# Patient Record
Sex: MALE | Race: BLACK OR AFRICAN AMERICAN
[De-identification: names, ages, dates, MRNs, and addresses within clinical notes are randomized per-mention and may not be internally consistent; named-entity substitution may affect disease eponyms.]

---

## 2017-07-10 NOTE — ER DOCUMENT REPORT
HPI





- HPI


Pain Level: 3


Notes: 


Patient is a 22-year-old male with the ED complaining of intermittent left eye 

redness for 3 weeks.  Patient states that his eye became red again this 

morning.  He does have some irritation to it, but declines any discharge or 

sharp pain.  Patient denies a foreign body in the eye.  No recent illness.  He 

denies any changes in his vision.  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 

worker and does not know if just got in there.  Patient does wear contacts 

which has been leaving out with his eye is red.  Patient states that he does 

have little to no photophobia.  He is still able to move the eye around without 

any difficulties or pain.  Denies any headache, fever, URI, ear pain, dizziness

, nasal congestion/discharge, sore throat, chest pain, palpitations, syncope, 

cough, shortness breath, dyspnea, wheezing, abdominal pain, nausea/vomiting/

diarrhea, dysuria, urethral discharge, joint pains, or rash.  Denies any daily 

meds.  Denies any drug allergies.  Denies any significant past medical history.





- ROS


Notes: 


REVIEW OF SYSTEMS:


CONSTITUTIONAL :  Denies fever,  chills, or sweats.  Denies recent illness.


EENT:   see hpi


CARDIOVASCULAR:  Denies chest pain.  Denies palpitations or racing or irregular 

heart beat.  Denies ankle edema.


RESPIRATORY:  Denies cough, cold, or chest congestion.  Denies shortness of 

breath, difficulty breathing, or wheezing.


GASTROINTESTINAL:  Denies abdominal pain or distention.  Denies nausea, vomiting

, or diarrhea.  Denies blood in vomitus, stools, or per rectum.  Denies black, 

tarry stools.  Denies constipation.  


GENITOURINARY:  Denies difficulty urinating, painful urination, burning, 

frequency, blood in urine, or discharge.


MUSCULOSKELETAL:  Denies back or neck pain or stiffness.  Denies joint pain or 

swelling.


SKIN:   Denies rash, lesions or sores.


NEUROLOGICAL:  Denies confusion or altered mental status.  Denies passing out 

or loss of consciousness.  Denies dizziness or lightheadedness.  Denies 

headache.  Denies weakness or paralysis or loss of use of either side.  Denies 

problems with gait or speech.  Denies sensory loss, numbness, or tingling.  

Denies seizures.


PSYCHIATRIC:  Denies anxiety or stress.  Denies depression, suicidal ideation, 

or homicidal ideation.





ALL OTHER SYSTEMS REVIEWED AND NEGATIVE.





Dictation was performed using Dragon voice recognition software





- DERM


Skin Color: Normal





Past Medical History





- Social History


Smoking Status: Unknown if Ever Smoked


Family History: Reviewed & Not Pertinent


Patient has suicidal ideation: No


Patient has homicidal ideation: No


Renal/ Medical History: Denies: Hx Peritoneal Dialysis





Vertical Provider Document





- CONSTITUTIONAL


Agree With Documented VS: Yes


Notes: 


PHYSICAL EXAMINATION:





GENERAL: Well-appearing, well-nourished and in no acute distress.





HEAD: Atraumatic, normocephalic.





EYES: Pupils equal round and reactive to light, extraocular movements intact, 

sclera anicteric, Left conjunctiva injected.  No orbital cellulitis or 

discharge. No obvious abrasion/laceration/ulceration on flourescein/wood's lamp 

exam.  Non-tender with movement.  Eye irrigated and lid everted/wiped.  No 

pupillary changes in shape.





ENT: EAC clear b/l.  TM's intact b/l without erythema, fluid, or perforation.  

Nares patent and without discharge.  oropharynx clear without exudates.  No 

tonsilar hypertrophy or erythema.  Moist mucous membranes.  No sinus tenderness.





NECK: Normal range of motion, supple without lymphadenopathy





LUNGS: Breath sounds clear to auscultation bilaterally and equal.  No wheezes 

rales or rhonchi.





HEART: Regular rate and rhythm without murmurs, rubs, gallops





PSYCH: Normal mood, normal affect.





SKIN: Warm, Dry, normal turgor, no rashes or lesions noted.





- INFECTION CONTROL


TRAVEL OUTSIDE OF THE [place] IN LAST 30 DAYS: No





- RESPIRATORY


O2 Sat by Pulse Oximetry: 96





Course





- Re-evaluation


Re-evalutation: 





07/10/17 17:24


Patient is an afebrile, well-hydrated, 22-year-old male with suspected iritis 

to the left eye.  Vitals are stable.  PE otherwise unremarkable for any 

retained corneal related foreign body, deep space infection including orbital 

cellulitis or abscess, acute glaucoma, penetrating globe injury, retinal 

detachment, meningitis, viral/bacterial keratitis.  Patient symptomatology 

matched closely with iritis along with physical exam.  Iritis warrants an 

urgent consult ophthalmology in the next 1-2 days.  I will send him home with 

Polytrim eyedrops along with ketorolac eyedrops to use as directed.  Avoid use 

of contacts.  Wash hands regularly and do not scratch the eye.  Conservative 

measures otherwise for symptoms.  Return to the ED with any worsening/

concerning symptoms as reviewed.  Patient is in agreement.








- Vital Signs


Vital signs: 


 











Temp Pulse Resp BP Pulse Ox


 


 97.5 F   55 L  16   129/80 H  96 


 


 07/10/17 15:59  07/10/17 15:59  07/10/17 15:59  07/10/17 15:59  07/10/17 15:59














Discharge





- Discharge


Clinical Impression: 


 Iritis of left eye





Condition: Stable


Disposition: HOME, SELF-CARE


Instructions:  Eyedrop Use ([facility])


Additional Instructions: 


Wash hands regularly


Do not scratch or touch your eyes


Avoid use of contacts


Use eyedrops as directed


Tylenol/ibuprofen as needed


It is important that you recheck with an ophthalmologist in the next 1-2 days.


Return to the ED with any worsening vision, eye pain, headaches, fever, 

discharge, changes in vision, spots in vision, chest pain, palpitations, syncope

, trouble breathing, shortness of breath, cough, abdominal pain, nausea/vomiting

/diarrhea, urethral discharge, joint pains, or any other worsening/concerning 

symptoms otherwise as needed.Iritis





     The examination of your eye shows iritis, an inflammation of the iris of 

the eye.  This is not an infection, but it can leave bands of scar tissue in 

the eye if untreated.  The usual symptoms of iritis are deep eye pain, 

sensitivity to light, blurred vision, and headache.


     Cortisone drops and pupil-dilating medication are used to treat iritis.  

Follow-up examination by an ophthalmologist (physician eye specialist) is often 

necessary.


     If there is a significant worsening of your condition -- such as severe 

pain, nausea and vomiting, or decreasing vision -- call the doctor or return 

for re-evaluation.





Prescriptions: 


Ketorolac Tromethamine 0.45% [Acuvail 0.45% Oph Soln 0.4 ml/Dropperette] 1 drop 

OS QID PRN #1 bottle


 PRN Reason: 


Polymyxin B Sulf/Trimethoprim [Polytrim Eye Drops] 2 drop OD Q6H #10 ml


Forms:  Elevated Blood Pressure


Referrals: 


IRMA EVANGELISTA DO [ACTIVE STAFF] - Follow up as needed

## 2017-10-30 NOTE — ER DOCUMENT REPORT
HPI





- HPI


Patient complains to provider of: pain and red right eye


Onset: This morning


Pain Level: 3


Context: 





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 Has contacts in. No hx 

ulcer. No drainage.


Associated Symptoms: None


Exacerbated by: Denies


Relieved by: Denies


Similar symptoms previously: Yes


Recently seen / treated by doctor: No





- ROS


ROS below otherwise negative: Yes


Systems Reviewed and Negative: Yes All other systems reviewed and negative





- DERM


Skin Color: Normal





Past Medical History





- General


Information source: Patient





- Social History


Smoking Status: Never Smoker


Frequency of alcohol use: None


Drug Abuse: None


Lives with: Family


Family History: Reviewed & Not Pertinent


Patient has suicidal ideation: No


Patient has homicidal ideation: No





- Medical History


Medical History: Negative


Renal/ Medical History: Denies: Hx Peritoneal Dialysis


Surgical Hx: Negative





Vertical Provider Document





- CONSTITUTIONAL


Agree With Documented VS: Yes


Exam Limitations: No Limitations


General Appearance: No Apparent Distress





- INFECTION CONTROL


TRAVEL OUTSIDE OF THE [place] IN LAST 30 DAYS: No





- HEENT


HEENT: Conjuctival Injection - right, no limbic flare, ant. chamber clear. tiny 

roung corneal defect with fluorescein uptake at 7 oclock, Normocephalic





- NECK


Neck: Supple





- RESPIRATORY


O2 Sat by Pulse Oximetry: 97





- NEURO


Level of Consciousness: Awake, Alert, Appropriate





Course





- Vital Signs


Vital signs: 


 











Temp Pulse Resp BP Pulse Ox


 


 97.5 F   58 L  16   132/80 H  97 


 


 10/30/17 10:37  10/30/17 10:37  10/30/17 10:37  10/30/17 10:37  10/30/17 10:37














Discharge





- Discharge


Clinical Impression: 


 corneal ulcer versus abrasion





Condition: Good


Disposition: HOME, SELF-CARE


Instructions:  Ketorolac Tromethamine Eye Drops ([facility]), Corneal Abrasion ([facility]), 

Quinolone Antibiotics ([facility])


Additional Instructions: 


no contact in right eye


see dr levine  at 2:15 pm today


besivance 1 drop right eye every 8 hours


acular 1 drop right eye every 8 hours





Please complete the patient satisfaction survey if you get one, and return it.. 

If you do not receive a survey,  then you can go to the [facility] website, [email or web address] 

and place your comments about your very good care. Thank you very much. It was 

a pleasure being your medical provider today.





Forms:  Return to Work


Referrals: 


OPAL LEVINE MD [ACTIVE STAFF] - 10/30/17
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
## 2018-08-25 ENCOUNTER — HOSPITAL ENCOUNTER (EMERGENCY)
Dept: HOSPITAL 62 - ER | Age: 23
Discharge: HOME | End: 2018-08-25
Payer: COMMERCIAL

## 2018-08-25 VITALS — SYSTOLIC BLOOD PRESSURE: 141 MMHG | DIASTOLIC BLOOD PRESSURE: 90 MMHG

## 2018-08-25 DIAGNOSIS — K04.7: Primary | ICD-10-CM

## 2018-08-25 PROCEDURE — 99283 EMERGENCY DEPT VISIT LOW MDM: CPT

## 2018-08-25 NOTE — ER DOCUMENT REPORT
HPI





- HPI


Patient complains to provider of: Dental infection


Onset: Last week


Onset/Duration: Worse


Quality of pain: Achy


Pain Level: 3


Context: 





Patient complains of dental pain for the past week that worsened over the past 

3 days.  Patient denies any fever or vomiting.


Associated Symptoms: Other - Dental pain.  denies: Fever


Exacerbated by: Denies


Relieved by: Denies


Similar symptoms previously: Yes


Recently seen / treated by doctor: No





- ROS


ROS below otherwise negative: Yes


Systems Reviewed and Negative: Yes All other systems reviewed and negative





- CONSTITUTIONAL


Constitutional: DENIES: Fever





- GASTROINTESTINAL


Gastrointestinal: DENIES: Nausea, Patient vomiting





- DERM


Skin Color: Normal


Skin Problems: None





Past Medical History





- General


Information source: Patient





- Social History


Smoking Status: Never Smoker


Frequency of alcohol use: None


Drug Abuse: None


Occupation: [employer]


Lives with: Family


Family History: Reviewed & Not Pertinent





- Medical History


Medical History: Negative


Renal/ Medical History: Denies: Hx Peritoneal Dialysis


Surgical Hx: Negative





- Immunizations


Immunizations up to date: Yes


Hx Diphtheria, Pertussis, Tetanus Vaccination: Yes





Vertical Provider Document





- CONSTITUTIONAL


Agree With Documented VS: Yes


Exam Limitations: No Limitations


General Appearance: WD/WN, No Apparent Distress





- INFECTION CONTROL


TRAVEL OUTSIDE OF THE [place] IN LAST 30 DAYS: No





- HEENT


HEENT: Atraumatic, Normocephalic


Mouth Diagram: 


  __________________________














  __________________________





 1 - Dental decay, small abscess to gingiva








- NECK


Neck: Normal Inspection, Supple.  negative: Lymphadenopathy-Left, 

Lymphadenopathy-Right





- RESPIRATORY


Respiratory: No Respiratory Distress





- MUSCULOSKELETAL/EXTREMETIES


Musculoskeletal/Extremeties: MAEW





- NEURO


Level of Consciousness: Awake, Alert, Appropriate


Motor/Sensory: No Motor Deficit





- DERM


Integumentary: Warm, Dry





Course





- Vital Signs


Vital signs: 


 











Temp Pulse Resp BP Pulse Ox


 


 98.4 F   52 L  16   141/90 H  99 


 


 08/25/18 15:07  08/25/18 15:07  08/25/18 15:07  08/25/18 15:07  08/25/18 15:07














Procedures





- Incision and Drainage


  ** Right


Type: Simple


Incision Method: Incision made with needle


Amount/type of drainage: Small amount of purulent drainage


Mouth/Teeth picture: 


  __________________________














  __________________________





 1 - Abscess








Discharge





- Discharge


Clinical Impression: 


 Dental abscess





Condition: Stable


Disposition: HOME, SELF-CARE


Instructions:  Dentist, Dental Infection or Abscess (Critical access hospital), Penicillin V K (Critical access hospital)


Additional Instructions: 


Return immediately for any new or worsening symptoms





Followup with your primary care provider, call tomorrow to make a followup 

appointment





Follow-up with a dental care for further evaluation


Prescriptions: 


Naproxen [Naprosyn 250 Nmg Tablet] 1 tab PO BID #14 tablet


Penicillin V Potassium [Penicillin Vk 500 mg Tablet] 500 mg PO BID #20 tablet


Tramadol HCl [Ultram 50 mg Tablet] 50 mg PO ASDIR PRN #12 tablet


 PRN Reason: 


Referrals: 


[facility] [Provider Group] - Follow up as needed